# Patient Record
(demographics unavailable — no encounter records)

---

## 2025-07-02 NOTE — ASSESSMENT
[FreeTextEntry1] : Discussed diagnosis and treatment with patient  Discussed etiology of symptoms patient is experiencing  Discussed all risks, complications, and benefits of topical vs. oral anti-fungal therapy. Amenable to both Obtained LFT baseline Rx antifungal topical bid  Discussed proper shoe gear with patient  Discussed the importance of daily foot exams and pedal hygiene  Explained that the oral antifungal therapy will require 3 months in duration, and that patient must let nails grow out for total 9-12 months to observe clearance of nail mycosis. Patient was advised to discontinue the oral medication if experiencing jaundice or elevated Liver Function Tests. Patient verbalized understanding  Patient to return to the office in 1 month for repeat LFT (after reviewing normal baseline)

## 2025-07-02 NOTE — REASON FOR VISIT
[Initial Visit] : an initial visit for [Onychomycosis] : onychomycosis [FreeTextEntry2] : right hallux nail injury

## 2025-07-02 NOTE — HISTORY OF PRESENT ILLNESS
[FreeTextEntry1] : Presents in the [facility] office for right hallux hollow nail. Patient cut nail down very short but would possible like it totally removed. He has noticed nail is discolored. Has not tried any treatment on the nail  has hx of injury to the nail about 3 months ago  denies pain.

## 2025-07-02 NOTE — PHYSICAL EXAM
[General Appearance - Alert] : alert [General Appearance - In No Acute Distress] : in no acute distress [Delayed in the Right Toes] : capillary refills normal in right toes [] : normal strength/tone [FreeTextEntry1] : Nail 1 right painful, thickened, discolored, dystrophic with subungual debris, short No open ulcerations or breaks in the integument bilateral  [Sensation] : the sensory exam was normal to light touch and pinprick [No Focal Deficits] : no focal deficits [Deep Tendon Reflexes (DTR)] : deep tendon reflexes were 2+ and symmetric [Motor Exam] : the motor exam was normal [Oriented To Time, Place, And Person] : oriented to person, place, and time [Impaired Insight] : insight and judgment were intact [Affect] : the affect was normal